# Patient Record
Sex: FEMALE | Race: BLACK OR AFRICAN AMERICAN | NOT HISPANIC OR LATINO | Employment: FULL TIME | URBAN - METROPOLITAN AREA
[De-identification: names, ages, dates, MRNs, and addresses within clinical notes are randomized per-mention and may not be internally consistent; named-entity substitution may affect disease eponyms.]

---

## 2017-08-01 ENCOUNTER — HOSPITAL ENCOUNTER (EMERGENCY)
Facility: HOSPITAL | Age: 32
Discharge: HOME | End: 2017-08-01
Attending: EMERGENCY MEDICINE
Payer: MEDICAID

## 2017-08-01 VITALS
OXYGEN SATURATION: 100 % | DIASTOLIC BLOOD PRESSURE: 80 MMHG | HEIGHT: 62 IN | BODY MASS INDEX: 30 KG/M2 | WEIGHT: 163 LBS | HEART RATE: 78 BPM | RESPIRATION RATE: 18 BRPM | SYSTOLIC BLOOD PRESSURE: 134 MMHG | TEMPERATURE: 98 F

## 2017-08-01 DIAGNOSIS — N39.0 URINARY TRACT INFECTION WITHOUT HEMATURIA, SITE UNSPECIFIED: Primary | ICD-10-CM

## 2017-08-01 DIAGNOSIS — N76.0 ACUTE VAGINITIS: ICD-10-CM

## 2017-08-01 LAB
B-HCG UR QL: NEGATIVE
BACTERIA #/AREA URNS HPF: ABNORMAL /HPF
BILIRUB UR QL STRIP: NEGATIVE
CLARITY UR: ABNORMAL
COLOR UR: YELLOW
GLUCOSE UR QL STRIP: NEGATIVE
HGB UR QL STRIP: NEGATIVE
KETONES UR QL STRIP: NEGATIVE
LEUKOCYTE ESTERASE UR QL STRIP: ABNORMAL
MICROSCOPIC COMMENT: ABNORMAL
NITRITE UR QL STRIP: NEGATIVE
PH UR STRIP: 6 [PH] (ref 5–8)
PROT UR QL STRIP: NEGATIVE
RBC #/AREA URNS HPF: 2 /HPF (ref 0–4)
SP GR UR STRIP: 1.02 (ref 1–1.03)
SQUAMOUS #/AREA URNS HPF: 5 /HPF
URN SPEC COLLECT METH UR: ABNORMAL
UROBILINOGEN UR STRIP-ACNC: 1 EU/DL
WBC #/AREA URNS HPF: 6 /HPF (ref 0–5)

## 2017-08-01 PROCEDURE — 81025 URINE PREGNANCY TEST: CPT

## 2017-08-01 PROCEDURE — 81000 URINALYSIS NONAUTO W/SCOPE: CPT

## 2017-08-01 PROCEDURE — 87591 N.GONORRHOEAE DNA AMP PROB: CPT

## 2017-08-01 PROCEDURE — 99283 EMERGENCY DEPT VISIT LOW MDM: CPT

## 2017-08-01 PROCEDURE — 25000003 PHARM REV CODE 250: Performed by: EMERGENCY MEDICINE

## 2017-08-01 RX ORDER — FLUCONAZOLE 100 MG/1
200 TABLET ORAL
Status: COMPLETED | OUTPATIENT
Start: 2017-08-01 | End: 2017-08-01

## 2017-08-01 RX ORDER — NITROFURANTOIN 25; 75 MG/1; MG/1
100 CAPSULE ORAL 2 TIMES DAILY
Qty: 10 CAPSULE | Refills: 0 | Status: SHIPPED | OUTPATIENT
Start: 2017-08-01 | End: 2017-08-06

## 2017-08-01 RX ADMIN — FLUCONAZOLE 200 MG: 100 TABLET ORAL at 12:08

## 2017-08-01 NOTE — ED PROVIDER NOTES
SCRIBE #1 NOTE: I, Adolph Raymundo, am scribing for, and in the presence of, Modesto Morejon Jr., MD. I have scribed the entire note.      History      Chief Complaint   Patient presents with    Sore Throat     X1 day       Review of patient's allergies indicates:  No Known Allergies     HPI   HPI    8/1/2017, 11:55 AM   History obtained from the patient      History of Present Illness: Joleen Hong is a 31 y.o. female patient who presents to the Emergency Department for possible yeast infection. Pt reports vaginal irritation which onset gradually 2 days ago. Symptoms are constant and moderate in severity. Sx are exacerbated by nothing and relieved by nothing. Pt also reports mild dysuria. No other sxs reported. Patient denies any fever, N/V/D, chills, vaginal discharge/bleeding, pelvic pain, difficulty urinating, hematuria, abd pain, urinary frequency/urgency and all other sxs at this time. No further complaints or concerns at this time.     Arrival mode: Personal vehicle     PCP: Provider [facility]       Past Medical History:  History reviewed. No pertinent past medical history.    Past Surgical History:  History reviewed. No pertinent surgical history.      Family History:  History reviewed. No pertinent family history.    Social History:  Social History     Social History Main Topics    Smoking status: Not on file    Smokeless tobacco: Not on file    Alcohol use Not on file    Drug use: Unknown    Sexual activity: Not on file       ROS   Review of Systems   Constitutional: Negative for chills and fever.   HENT: Negative for congestion and sore throat.    Respiratory: Negative for chest tightness and shortness of breath.    Cardiovascular: Negative for chest pain.   Gastrointestinal: Negative for abdominal pain, nausea and vomiting.   Genitourinary: Positive for dysuria. Negative for decreased urine volume, difficulty urinating, flank pain, frequency, hematuria, pelvic pain, urgency, vaginal  "bleeding and vaginal discharge.        (+)vaginal irritation   Musculoskeletal: Negative for back pain and neck pain.   Skin: Negative for rash.   Neurological: Negative for dizziness, weakness, light-headedness, numbness and headaches.   Psychiatric/Behavioral: Negative for agitation and confusion.   All other systems reviewed and are negative.      Physical Exam      Initial Vitals [08/01/17 1138]   BP Pulse Resp Temp SpO2   135/78 72 18 98.2 °F (36.8 °C) 100 %      MAP       97          Physical Exam  Nursing Notes and Vital Signs Reviewed.  Constitutional: Patient is in no apparent distress. Well-developed and well-nourished.  Head: Atraumatic. Normocephalic.  Eyes: PERRL. EOM intact. Conjunctivae are not pale. No scleral icterus.  ENT: Mucous membranes are moist. Oropharynx is clear and symmetric.    Neck: Supple. Full ROM. No lymphadenopathy.  Cardiovascular: Regular rate. Regular rhythm. No murmurs, rubs, or gallops. Distal pulses are 2+ and symmetric.  Pulmonary/Chest: No respiratory distress. Clear to auscultation bilaterally. No wheezing, rales, or rhonchi.  Abdominal: Soft and non-distended.  There is no tenderness.  No rebound, guarding, or rigidity. Good bowel sounds.  Musculoskeletal: Moves all extremities. No obvious deformities. No edema. No calf tenderness.  Skin: Warm and dry.  Neurological:  Alert, awake, and appropriate.  Normal speech.  No acute focal neurological deficits are appreciated.  Psychiatric: Normal affect. Good eye contact. Appropriate in content.    ED Course    Procedures  ED Vital Signs:  Vitals:    08/01/17 1138 08/01/17 1349   BP: 135/78 134/80   Pulse: 72 78   Resp: 18 18   Temp: 98.2 °F (36.8 °C) 98.3 °F (36.8 °C)   TempSrc: Oral Oral   SpO2: 100% 100%   Weight: 73.9 kg (163 lb)    Height: 5' 2" (1.575 m)        Abnormal Lab Results:  Labs Reviewed   URINALYSIS - Abnormal; Notable for the following:        Result Value    Appearance, UA Cloudy (*)     Leukocytes, UA 1+ (*)     " All other components within normal limits   URINALYSIS MICROSCOPIC - Abnormal; Notable for the following:     WBC, UA 6 (*)     All other components within normal limits   C. TRACHOMATIS/N. GONORRHOEAE BY AMP DNA   C. TRACHOMATIS/N. GONORRHOEAE BY AMP DNA   PREGNANCY TEST, URINE RAPID        All Lab Results:  Results for orders placed or performed during the hospital encounter of 08/01/17   Urinalysis   Result Value Ref Range    Specimen UA Urine, Clean Catch     Color, UA Yellow Yellow, Straw, Josselyn    Appearance, UA Cloudy (A) Clear    pH, UA 6.0 5.0 - 8.0    Specific Gravity, UA 1.025 1.005 - 1.030    Protein, UA Negative Negative    Glucose, UA Negative Negative    Ketones, UA Negative Negative    Bilirubin (UA) Negative Negative    Occult Blood UA Negative Negative    Nitrite, UA Negative Negative    Urobilinogen, UA 1.0 <2.0 EU/dL    Leukocytes, UA 1+ (A) Negative   Pregnancy, urine rapid   Result Value Ref Range    Preg Test, Ur Negative    Urinalysis Microscopic   Result Value Ref Range    RBC, UA 2 0 - 4 /hpf    WBC, UA 6 (H) 0 - 5 /hpf    Bacteria, UA Occasional None-Occ /hpf    Squam Epithel, UA 5 /hpf    Microscopic Comment SEE COMMENT          Imaging Results:  Imaging Results    None                 The Emergency Provider reviewed the vital signs and test results, which are outlined above.    ED Discussion     1:22 PM: Reassessed pt at this time. Pt is in NAD. Pt is awake, alert, and oriented. Discussed with pt all pertinent ED information and results. Discussed pt dx and plan of tx. Gave pt all f/u and return to the ED instructions. All questions and concerns were addressed at this time. Pt expresses understanding of information and instructions, and is comfortable with plan to discharge. Pt is stable for discharge.        ED Medication(s):  Medications   fluconazole tablet 200 mg (200 mg Oral Given 8/1/17 1215)       Discharge Medication List as of 8/1/2017  1:48 PM      START taking these  medications    Details   nitrofurantoin, macrocrystal-monohydrate, (MACROBID) 100 MG capsule Take 1 capsule (100 mg total) by mouth 2 (two) times daily., Starting Tue 8/1/2017, Until Sun 8/6/2017, Print                   Medical Decision Making    Medical Decision Making:   Clinical Tests:   Lab Tests: Ordered and Reviewed           Scribe Attestation:   Scribe #1: I performed the above scribed service and the documentation accurately describes the services I performed. I attest to the accuracy of the note.    Attending:   Physician Attestation Statement for Scribe #1: I, Modesto Morejon Jr., MD, personally performed the services described in this documentation, as scribed by Adolph Raymundo, in my presence, and it is both accurate and complete.          Clinical Impression       ICD-10-CM ICD-9-CM   1. Urinary tract infection without hematuria, site unspecified N39.0 599.0   2. Acute vaginitis N76.0 616.10       Disposition:   Disposition: Discharged  Condition: Stable         Modesto Morejon Jr., MD  08/01/17 7179

## 2017-08-01 NOTE — ED NOTES
Patient complains of vaginal itching and possible STD exposure. Patient reports having unprotected sex approx 3 weeks ago and today she began having a vaginal itch.   Level of Consciousness: The patient is awake, alert, and oriented.  Appearance: Sitting up in treatment area with no acute distress noted. Clothing and hygiene are clean and worn appropriately.  Skin: Skin is intact; color consistent with ethnicity.    Musculoskeletal: Moves all extremities well in full range of motion. No obvious deformities or swelling noted.  Respiratory: Airway open and patent, respirations spontaneous, even and unlabored. No accessory muscles in use.   Cardiac: Regular rate, no peripheral edema noted..  Abdomen:  No distention noted.  Neurologic: PERRLA, face exhibits symmetrical expression, reports normal sensation to all extremities and face.    Patient verbalized understanding of status and plan of care.

## 2017-08-02 LAB
C TRACH DNA SPEC QL NAA+PROBE: NOT DETECTED
N GONORRHOEA DNA SPEC QL NAA+PROBE: NOT DETECTED

## 2021-10-20 RX ORDER — METRONIDAZOLE 500 MG/1
TABLET ORAL
COMMUNITY
Start: 2021-07-22 | End: 2021-10-21

## 2021-10-20 RX ORDER — FLUCONAZOLE 150 MG/1
TABLET ORAL
COMMUNITY
Start: 2021-07-22 | End: 2021-10-21

## 2021-10-20 RX ORDER — NYSTATIN AND TRIAMCINOLONE ACETONIDE 100000; 1 [USP'U]/G; MG/G
CREAM TOPICAL
COMMUNITY
End: 2021-10-21

## 2022-01-04 ENCOUNTER — TELEPHONE (OUTPATIENT)
Dept: INTERNAL MEDICINE | Facility: CLINIC | Age: 37
End: 2022-01-04
Payer: MEDICAID

## 2022-01-04 NOTE — TELEPHONE ENCOUNTER
Offered pt. Options. Pt. Will call next week when schedule clears up.      ----- Message from Breana Slater sent at 1/4/2022 12:59 PM CST -----  Contact: Patient  Patient would like a call back concerning her 01/06/22 appointment, can she come in on 01/05/22 or 01/07/22, patient has to work on 01/06/22. Please call to advise at Ph .[phone] (home)

## 2022-02-02 ENCOUNTER — TELEPHONE (OUTPATIENT)
Dept: OBSTETRICS AND GYNECOLOGY | Facility: CLINIC | Age: 37
End: 2022-02-02
Payer: MEDICAID

## 2022-02-02 NOTE — TELEPHONE ENCOUNTER
----- Message from Dory Garcia sent at 2/2/2022 12:19 PM CST -----  Contact: Joleen  Type:  Sooner Apoointment Request    Caller is requesting a sooner appointment.  Caller declined first available appointment listed below.  Caller will not accept being placed on the waitlist and is requesting a message be sent to doctor.  Name of Caller: Joleen  When is the first available appointment?4/2022  Symptoms: Abnormal Bleeding/ spotting, odor   Would the patient rather a call back or a response via [facility]? Call back  Best Call Back Number: [phone]  Additional Information:

## 2022-02-03 ENCOUNTER — LAB VISIT (OUTPATIENT)
Dept: LAB | Facility: HOSPITAL | Age: 37
End: 2022-02-03
Attending: NURSE PRACTITIONER
Payer: MEDICAID

## 2022-02-03 ENCOUNTER — TELEPHONE (OUTPATIENT)
Dept: OBSTETRICS AND GYNECOLOGY | Facility: CLINIC | Age: 37
End: 2022-02-03
Payer: MEDICAID

## 2022-02-03 ENCOUNTER — OFFICE VISIT (OUTPATIENT)
Dept: OBSTETRICS AND GYNECOLOGY | Facility: CLINIC | Age: 37
End: 2022-02-03
Payer: MEDICAID

## 2022-02-03 VITALS
WEIGHT: 175.25 LBS | BODY MASS INDEX: 32.25 KG/M2 | DIASTOLIC BLOOD PRESSURE: 78 MMHG | SYSTOLIC BLOOD PRESSURE: 102 MMHG | HEIGHT: 62 IN

## 2022-02-03 DIAGNOSIS — N92.6 IRREGULAR BLEEDING: Primary | ICD-10-CM

## 2022-02-03 DIAGNOSIS — R39.9 UTI SYMPTOMS: ICD-10-CM

## 2022-02-03 DIAGNOSIS — N92.6 IRREGULAR BLEEDING: ICD-10-CM

## 2022-02-03 LAB
BASOPHILS # BLD AUTO: 0.03 K/UL (ref 0–0.2)
BASOPHILS NFR BLD: 0.6 % (ref 0–1.9)
DIFFERENTIAL METHOD: ABNORMAL
EOSINOPHIL # BLD AUTO: 0.2 K/UL (ref 0–0.5)
EOSINOPHIL NFR BLD: 4.7 % (ref 0–8)
ERYTHROCYTE [DISTWIDTH] IN BLOOD BY AUTOMATED COUNT: 17.5 % (ref 11.5–14.5)
HCT VFR BLD AUTO: 37.3 % (ref 37–48.5)
HGB BLD-MCNC: 11.3 G/DL (ref 12–16)
IMM GRANULOCYTES # BLD AUTO: 0.01 K/UL (ref 0–0.04)
IMM GRANULOCYTES NFR BLD AUTO: 0.2 % (ref 0–0.5)
LYMPHOCYTES # BLD AUTO: 1.8 K/UL (ref 1–4.8)
LYMPHOCYTES NFR BLD: 35.7 % (ref 18–48)
MCH RBC QN AUTO: 24.6 PG (ref 27–31)
MCHC RBC AUTO-ENTMCNC: 30.3 G/DL (ref 32–36)
MCV RBC AUTO: 81 FL (ref 82–98)
MONOCYTES # BLD AUTO: 0.5 K/UL (ref 0.3–1)
MONOCYTES NFR BLD: 9.2 % (ref 4–15)
NEUTROPHILS # BLD AUTO: 2.6 K/UL (ref 1.8–7.7)
NEUTROPHILS NFR BLD: 49.6 % (ref 38–73)
NRBC BLD-RTO: 0 /100 WBC
PLATELET # BLD AUTO: 320 K/UL (ref 150–450)
PMV BLD AUTO: 12.3 FL (ref 9.2–12.9)
RBC # BLD AUTO: 4.59 M/UL (ref 4–5.4)
WBC # BLD AUTO: 5.13 K/UL (ref 3.9–12.7)

## 2022-02-03 PROCEDURE — 1159F MED LIST DOCD IN RCRD: CPT | Mod: CPTII,,, | Performed by: NURSE PRACTITIONER

## 2022-02-03 PROCEDURE — 3078F PR MOST RECENT DIASTOLIC BLOOD PRESSURE < 80 MM HG: ICD-10-PCS | Mod: CPTII,,, | Performed by: NURSE PRACTITIONER

## 2022-02-03 PROCEDURE — 99213 OFFICE O/P EST LOW 20 MIN: CPT | Mod: PBBFAC | Performed by: NURSE PRACTITIONER

## 2022-02-03 PROCEDURE — 3074F SYST BP LT 130 MM HG: CPT | Mod: CPTII,,, | Performed by: NURSE PRACTITIONER

## 2022-02-03 PROCEDURE — 99999 PR PBB SHADOW E&M-EST. PATIENT-LVL III: CPT | Mod: PBBFAC,,, | Performed by: NURSE PRACTITIONER

## 2022-02-03 PROCEDURE — 99203 PR OFFICE/OUTPT VISIT, NEW, LEVL III, 30-44 MIN: ICD-10-PCS | Mod: S$PBB,,, | Performed by: NURSE PRACTITIONER

## 2022-02-03 PROCEDURE — 85025 COMPLETE CBC W/AUTO DIFF WBC: CPT | Performed by: NURSE PRACTITIONER

## 2022-02-03 PROCEDURE — 3008F PR BODY MASS INDEX (BMI) DOCUMENTED: ICD-10-PCS | Mod: CPTII,,, | Performed by: NURSE PRACTITIONER

## 2022-02-03 PROCEDURE — 99999 PR PBB SHADOW E&M-EST. PATIENT-LVL III: ICD-10-PCS | Mod: PBBFAC,,, | Performed by: NURSE PRACTITIONER

## 2022-02-03 PROCEDURE — 36415 COLL VENOUS BLD VENIPUNCTURE: CPT | Performed by: NURSE PRACTITIONER

## 2022-02-03 PROCEDURE — 87491 CHLMYD TRACH DNA AMP PROBE: CPT | Performed by: NURSE PRACTITIONER

## 2022-02-03 PROCEDURE — 3078F DIAST BP <80 MM HG: CPT | Mod: CPTII,,, | Performed by: NURSE PRACTITIONER

## 2022-02-03 PROCEDURE — 99203 OFFICE O/P NEW LOW 30 MIN: CPT | Mod: S$PBB,,, | Performed by: NURSE PRACTITIONER

## 2022-02-03 PROCEDURE — 87591 N.GONORRHOEAE DNA AMP PROB: CPT | Performed by: NURSE PRACTITIONER

## 2022-02-03 PROCEDURE — 1160F RVW MEDS BY RX/DR IN RCRD: CPT | Mod: CPTII,,, | Performed by: NURSE PRACTITIONER

## 2022-02-03 PROCEDURE — 84443 ASSAY THYROID STIM HORMONE: CPT | Performed by: NURSE PRACTITIONER

## 2022-02-03 PROCEDURE — 3074F PR MOST RECENT SYSTOLIC BLOOD PRESSURE < 130 MM HG: ICD-10-PCS | Mod: CPTII,,, | Performed by: NURSE PRACTITIONER

## 2022-02-03 PROCEDURE — 1159F PR MEDICATION LIST DOCUMENTED IN MEDICAL RECORD: ICD-10-PCS | Mod: CPTII,,, | Performed by: NURSE PRACTITIONER

## 2022-02-03 PROCEDURE — 87086 URINE CULTURE/COLONY COUNT: CPT | Performed by: NURSE PRACTITIONER

## 2022-02-03 PROCEDURE — 3008F BODY MASS INDEX DOCD: CPT | Mod: CPTII,,, | Performed by: NURSE PRACTITIONER

## 2022-02-03 PROCEDURE — 1160F PR REVIEW ALL MEDS BY PRESCRIBER/CLIN PHARMACIST DOCUMENTED: ICD-10-PCS | Mod: CPTII,,, | Performed by: NURSE PRACTITIONER

## 2022-02-03 NOTE — PROGRESS NOTES
Chief Complaint   Patient presents with    Abnormal bleeding        Joleen Hong is a 36 y.o. female No obstetric history on file.   Cycles are regular and last 5 days but were extremely heavy   Had an ablation a couple of months (cant remember exact month)  ago with Dr. Delcid at [facility]. Went back for a follow up and everything was fine.   Presents today because she has been spotting and she could not get in to see Dr. Delcid until 2022  She has been spotting for the past couple of days but only after she urinates    Past Medical History:   Diagnosis Date    Abnormal glandular Papanicolaou smear of cervix 2013 5:31:06 PM    [facility] Historical - [facility]: Abnormal PAP Smear-ASCUS POSTIVE HPV    Abnormal Pap smear of cervix     Anemia     Condition in fetus originating in the  period 2018 5:55:35 PM    [facility] Historical - Unknown: Stillbirth of single fetus-preg #3    COVID     History of other drug therapy 2021 9:41:56 AM    [facility] Historical - Unknown: COVID-19 vaccine series completed-No Additional Notes    History of stillbirth     Menorrhagia     Multiple birth (>2) liveborn, mates stillborn      Past Surgical History:   Procedure Laterality Date    APPENDECTOMY      COLPOSCOPY      HYSTEROSCOPY      TUBAL LIGATION      uterine ablation      WISDOM TOOTH EXTRACTION       Social History     Tobacco Use    Smoking status: Never Smoker    Smokeless tobacco: Never Used   Substance Use Topics    Alcohol use: Never    Drug use: Never     Family History   Problem Relation Age of Onset    No Known Problems Mother     No Known Problems Father     No Known Problems Sister     No Known Problems Brother     No Known Problems Maternal Grandmother     No Known Problems Maternal Grandfather     No Known Problems Paternal Grandmother     No Known Problems Paternal Grandfather      OB History   No obstetric history on file.  "      Medication List with Changes/Refills   Current Medications    CETIRIZINE (ZYRTEC) 10 MG TABLET    Take 10 mg by mouth once daily.    ESCITALOPRAM OXALATE (LEXAPRO) 5 MG TAB    Take 5 mg by mouth once daily.    FLUTICASONE PROPIONATE (FLONASE) 50 MCG/ACTUATION NASAL SPRAY    1 spray by Each Nostril route once daily.      /78   Ht 5' 2" (1.575 m)   Wt 79.5 kg (175 lb 4.3 oz)   LMP 12/10/2021 (Exact Date)   BMI 32.06 kg/m²     ROS:  Review of Systems  See HPI     PHYSICAL EXAM:  Physical Exam  Genitourinary:      Vulva exam comments: Normal.      Vaginal bleeding present.      Vaginal exam comments: Normal .      Adnexa exam comments: Normal .      Cervical exam comments: Normal .               ASSESSMENT and PLAN:    ICD-10-CM ICD-9-CM    1. Irregular bleeding  N92.6 626.4 CBC Auto Differential      TSH      C. trachomatis/N. gonorrhoeae by AMP DNA [facility]; Vagina      POCT Urine Pregnancy      US Pelvis Limited Non OB   2. UTI symptoms  R39.9 788.99 Urine culture     Will rule out uti   Rule out anemia, thyroid issues, infection and pregnancy   If all test are negative may be normal spotting related to ablation   Latasha Starkey NP     "

## 2022-02-04 ENCOUNTER — TELEPHONE (OUTPATIENT)
Dept: RADIOLOGY | Facility: HOSPITAL | Age: 37
End: 2022-02-04
Payer: MEDICAID

## 2022-02-04 LAB
C TRACH DNA SPEC QL NAA+PROBE: NOT DETECTED
N GONORRHOEA DNA SPEC QL NAA+PROBE: NOT DETECTED
TSH SERPL DL<=0.005 MIU/L-ACNC: 2.49 UIU/ML (ref 0.4–4)

## 2022-02-05 LAB — BACTERIA UR CULT: NO GROWTH

## 2022-02-07 ENCOUNTER — HOSPITAL ENCOUNTER (OUTPATIENT)
Dept: RADIOLOGY | Facility: HOSPITAL | Age: 37
Discharge: HOME | End: 2022-02-07
Attending: NURSE PRACTITIONER
Payer: MEDICAID

## 2022-02-07 DIAGNOSIS — N92.6 IRREGULAR BLEEDING: ICD-10-CM

## 2022-02-07 PROCEDURE — 76830 TRANSVAGINAL US NON-OB: CPT | Mod: TC

## 2022-02-07 PROCEDURE — 76830 US PELVIS COMP WITH TRANSVAG NON-OB (XPD): ICD-10-PCS | Mod: 26,,, | Performed by: RADIOLOGY

## 2022-02-07 PROCEDURE — 76830 TRANSVAGINAL US NON-OB: CPT | Mod: 26,,, | Performed by: RADIOLOGY

## 2022-02-07 PROCEDURE — 76856 US EXAM PELVIC COMPLETE: CPT | Mod: 26,,, | Performed by: RADIOLOGY

## 2022-02-07 PROCEDURE — 76856 US PELVIS COMP WITH TRANSVAG NON-OB (XPD): ICD-10-PCS | Mod: 26,,, | Performed by: RADIOLOGY

## 2022-02-11 ENCOUNTER — TELEPHONE (OUTPATIENT)
Dept: OBSTETRICS AND GYNECOLOGY | Facility: CLINIC | Age: 37
End: 2022-02-11
Payer: MEDICAID

## 2022-02-11 NOTE — TELEPHONE ENCOUNTER
Please call and inform her of the following:   Received pap smear results from [facility] dated 7/2021 with NIL results. P:  Repeat pap smear 2024

## 2024-01-02 ENCOUNTER — PATIENT MESSAGE (OUTPATIENT)
Dept: PRIMARY CARE CLINIC | Facility: CLINIC | Age: 39
End: 2024-01-02
Payer: MEDICAID

## 2024-01-02 ENCOUNTER — TELEPHONE (OUTPATIENT)
Dept: PRIMARY CARE CLINIC | Facility: CLINIC | Age: 39
End: 2024-01-02
Payer: MEDICAID

## 2024-01-02 NOTE — TELEPHONE ENCOUNTER
Tried calling pt. But phone is not in service. I sent my chart message stating her appointment needs to be rescheduled due to the fact of Dr. Bullard not being in office today.

## 2024-08-06 ENCOUNTER — HOSPITAL ENCOUNTER (EMERGENCY)
Facility: HOSPITAL | Age: 39
Discharge: HOME | End: 2024-08-06
Attending: EMERGENCY MEDICINE
Payer: MEDICAID

## 2024-08-06 VITALS
RESPIRATION RATE: 19 BRPM | SYSTOLIC BLOOD PRESSURE: 123 MMHG | TEMPERATURE: 98 F | BODY MASS INDEX: 33.57 KG/M2 | HEIGHT: 62 IN | DIASTOLIC BLOOD PRESSURE: 77 MMHG | OXYGEN SATURATION: 98 % | HEART RATE: 75 BPM | WEIGHT: 182.44 LBS

## 2024-08-06 DIAGNOSIS — S63.614A SPRAIN OF RIGHT RING FINGER, UNSPECIFIED SITE OF DIGIT, INITIAL ENCOUNTER: Primary | ICD-10-CM

## 2024-08-06 PROCEDURE — 29125 APPL SHORT ARM SPLINT STATIC: CPT | Mod: RT,ER

## 2024-08-06 PROCEDURE — 99283 EMERGENCY DEPT VISIT LOW MDM: CPT | Mod: 25,ER

## 2024-08-06 RX ORDER — HYDROCODONE BITARTRATE AND ACETAMINOPHEN 5; 325 MG/1; MG/1
1 TABLET ORAL EVERY 6 HOURS PRN
Qty: 12 TABLET | Refills: 0 | Status: SHIPPED | OUTPATIENT
Start: 2024-08-06

## 2024-08-06 NOTE — Clinical Note
"Joleen Mirandayajaira Hong was seen and treated in our emergency department on 8/6/2024.  She may return to work on 08/12/2024.       If you have any questions or concerns, please don't hesitate to call.       RN    "

## 2024-08-07 NOTE — ED PROVIDER NOTES
Encounter Date: 2024       History     Chief Complaint   Patient presents with    Hand Pain     C/o pain to right hand ring finger; pt reports swelling and throbbing, no trauma known to occur to finger      38-year-old female presents with pain in her right 4th finger.  Patient states it started  after she went bowling on Saturday.  She states the pain is severe and throbbing.  Extension makes the pain worse and ibuprofen makes the pain better.  She denies any fevers.    The history is provided by the patient.     Review of patient's allergies indicates:   Allergen Reactions    Chocolate flavor Hives, Itching and Other (See Comments)     Sometimes passes out    Shellfish containing products Hives, Itching and Other (See Comments)     Allergic to all seafood.  Sometimes passes out.     Past Medical History:   Diagnosis Date    Abnormal glandular Papanicolaou smear of cervix 2013 5:31:06 PM    [facility] Historical - [facility]: Abnormal PAP Smear-ASCUS POSTIVE HPV    Abnormal Pap smear of cervix     Anemia     Condition in fetus originating in the  period 2018 5:55:35 PM    [facility] Historical - Unknown: Stillbirth of single fetus-preg #3    COVID     History of other drug therapy 2021 9:41:56 AM    [facility] Historical - Unknown: COVID-19 vaccine series completed-No Additional Notes    History of stillbirth     Menorrhagia     Multiple birth (>2) liveborn, mates stillborn      Past Surgical History:   Procedure Laterality Date    APPENDECTOMY      COLPOSCOPY      HYSTEROSCOPY      TUBAL LIGATION      uterine ablation      WISDOM TOOTH EXTRACTION       Family History   Problem Relation Name Age of Onset    No Known Problems Mother      No Known Problems Father      No Known Problems Sister      No Known Problems Brother      No Known Problems Maternal Grandmother      No Known Problems Maternal Grandfather      No Known Problems Paternal Grandmother      No Known Problems Paternal  Grandfather       Social History     Tobacco Use    Smoking status: Never    Smokeless tobacco: Never   Substance Use Topics    Alcohol use: Never    Drug use: Never     Review of Systems   Constitutional:  Negative for fever.   Musculoskeletal:  Positive for arthralgias and myalgias.       Physical Exam     Initial Vitals [08/06/24 2121]   BP Pulse Resp Temp SpO2   123/77 75 19 98.3 °F (36.8 °C) 98 %      MAP       --         Physical Exam    Vitals reviewed.  Constitutional: She appears well-developed and well-nourished. She is not diaphoretic. No distress.   HENT:   Head: Normocephalic and atraumatic.   Eyes: EOM are normal. No scleral icterus.   Cardiovascular:  Normal rate.           Pulmonary/Chest: No respiratory distress.   Abdominal: She exhibits no distension.   Musculoskeletal:      Right hand: Tenderness (4th digit) present. No swelling, deformity or lacerations. Decreased range of motion (4th digit). Normal strength. Normal sensation. Normal capillary refill. Normal pulse.      Comments: No tenderness of the flexor tendon sheath.     Neurological: She is alert and oriented to person, place, and time.   Skin: Skin is warm and dry. Capillary refill takes less than 2 seconds.   Psychiatric: She has a normal mood and affect.         ED Course   Procedures  Labs Reviewed - No data to display       Imaging Results              X-Ray Finger 2 or More Views Right (Final result)  Result time 08/06/24 23:09:36      Final result by Vicky Guillen MD (08/06/24 23:09:36)                   Impression:      No acute fracture or dislocation seen three-view exam.  Joint spaces maintained no aggressive bony finding      Electronically signed by: Vicky Guillen  Date:    08/06/2024  Time:    23:09               Narrative:    EXAMINATION:  XR FINGER 2 OR MORE VIEWS RIGHT    CLINICAL HISTORY:  right 4th digit;                                       Medications - No data to display  Medical Decision  Making  Questionable nondisplaced, non-angulated fracture at the distal aspect of the 4th proximal phalanx.  I will place an ulnar gutter splint and have patient repeat x-ray in 1 week to evaluate for occult fracture.  RICE therapy with the pain medication.      Amount and/or Complexity of Data Reviewed  Radiology: ordered.    Risk  Prescription drug management.      Additional MDM:   Differential Diagnosis:   Differential diagnosis includes but is not limited to sprain, strain, fracture, dislocation, retained foreign body.  Suspicion low for flexor tenosynovitis as patient has none of the Kanavel's Signs.                                         Clinical Impression:  Final diagnoses:  [G03.469N] Sprain of right ring finger, unspecified site of digit, initial encounter (Primary)          ED Disposition Condition    Discharge Stable          ED Prescriptions       Medication Sig Dispense Start Date End Date Auth. Provider    HYDROcodone-acetaminophen (NORCO) 5-325 mg per tablet Take 1 tablet by mouth every 6 (six) hours as needed for Pain. 12 tablet 8/6/2024 -- Domi Jones, DO          Follow-up Information       Follow up With Specialties Details Why Contact [facility] - Emergency Dept Emergency Medicine In 1 week For repeat x-rays [id number]  [facility] [id number] [phone]    [place], [facility] -  In 1 week  [address]  [place] [address]  [phone]               Domi Jones,   08/06/24 6050

## 2024-08-07 NOTE — ED NOTES
Patient examined, evaluated, and educated on discharge prescriptions and instructions by MD Robert. [facility]